# Patient Record
Sex: MALE | Race: OTHER
[De-identification: names, ages, dates, MRNs, and addresses within clinical notes are randomized per-mention and may not be internally consistent; named-entity substitution may affect disease eponyms.]

---

## 2018-06-09 ENCOUNTER — HOSPITAL ENCOUNTER (EMERGENCY)
Dept: HOSPITAL 76 - ED | Age: 83
Discharge: HOME | End: 2018-06-09
Payer: MEDICARE

## 2018-06-09 VITALS — DIASTOLIC BLOOD PRESSURE: 69 MMHG | SYSTOLIC BLOOD PRESSURE: 123 MMHG

## 2018-06-09 DIAGNOSIS — R10.33: Primary | ICD-10-CM

## 2018-06-09 DIAGNOSIS — K59.00: ICD-10-CM

## 2018-06-09 LAB
ALBUMIN DIAFP-MCNC: 3.9 G/DL (ref 3.2–5.5)
ALBUMIN/GLOB SERPL: 1.3 {RATIO} (ref 1–2.2)
ALP SERPL-CCNC: 56 IU/L (ref 42–121)
ALT SERPL W P-5'-P-CCNC: 17 IU/L (ref 10–60)
ANION GAP SERPL CALCULATED.4IONS-SCNC: 7 MMOL/L (ref 6–13)
AST SERPL W P-5'-P-CCNC: 18 IU/L (ref 10–42)
BASE EXCESS BLDV CALC-SCNC: 3.9 MMOL/L
BASOPHILS NFR BLD AUTO: 0.4 10^3/UL (ref 0–0.1)
BASOPHILS NFR BLD AUTO: 5 %
BILIRUB BLD-MCNC: 0.9 MG/DL (ref 0.2–1)
BUN SERPL-MCNC: 22 MG/DL (ref 6–20)
CALCIUM UR-MCNC: 9.5 MG/DL (ref 8.5–10.3)
CHLORIDE SERPL-SCNC: 99 MMOL/L (ref 101–111)
CO2 BLDV-SCNC: 31.5 MMOL/L (ref 24–29)
CO2 SERPL-SCNC: 31 MMOL/L (ref 21–32)
CREAT SERPLBLD-SCNC: 1.2 MG/DL (ref 0.6–1.2)
EOSINOPHIL # BLD AUTO: 0.2 10^3/UL (ref 0–0.7)
EOSINOPHIL NFR BLD AUTO: 2.6 %
ERYTHROCYTE [DISTWIDTH] IN BLOOD BY AUTOMATED COUNT: 13.4 % (ref 12–15)
GFRSERPLBLD MDRD-ARVRAT: 57 ML/MIN/{1.73_M2} (ref 89–?)
GLOBULIN SER-MCNC: 3 G/DL (ref 2.1–4.2)
GLUCOSE SERPL-MCNC: 104 MG/DL (ref 70–100)
HGB UR QL STRIP: 15.1 G/DL (ref 14–18)
LIPASE SERPL-CCNC: 30 U/L (ref 22–51)
LYMPHOCYTES # SPEC AUTO: 1.4 10^3/UL (ref 1.5–3.5)
LYMPHOCYTES NFR BLD AUTO: 18.9 %
MAGNESIUM SERPL-MCNC: 2.2 MG/DL (ref 1.7–2.8)
MCH RBC QN AUTO: 31.5 PG (ref 27–31)
MCHC RBC AUTO-ENTMCNC: 33.1 G/DL (ref 32–36)
MCV RBC AUTO: 95.1 FL (ref 80–94)
MONOCYTES # BLD AUTO: 0.6 10^3/UL (ref 0–1)
MONOCYTES NFR BLD AUTO: 8.2 %
NEUTROPHILS # BLD AUTO: 4.8 10^3/UL (ref 1.5–6.6)
NEUTROPHILS # SNV AUTO: 7.3 X10^3/UL (ref 4.8–10.8)
NEUTROPHILS NFR BLD AUTO: 65.3 %
PCO2 BLDV: 49.9 MMHG (ref 41–51)
PDW BLD AUTO: 7.9 FL (ref 7.4–11.4)
PH BLDV: 7.4 [PH] (ref 7.31–7.41)
PLATELET # BLD: 209 10^3/UL (ref 130–450)
PO2 BLDV: 23.7 MMHG (ref 25–47)
PROT SPEC-MCNC: 6.9 G/DL (ref 6.7–8.2)
RBC MAR: 4.79 10^6/UL (ref 4.7–6.1)
SODIUM SERPLBLD-SCNC: 137 MMOL/L (ref 135–145)

## 2018-06-09 PROCEDURE — 83735 ASSAY OF MAGNESIUM: CPT

## 2018-06-09 PROCEDURE — 99284 EMERGENCY DEPT VISIT MOD MDM: CPT

## 2018-06-09 PROCEDURE — 74177 CT ABD & PELVIS W/CONTRAST: CPT

## 2018-06-09 PROCEDURE — 85025 COMPLETE CBC W/AUTO DIFF WBC: CPT

## 2018-06-09 PROCEDURE — 36415 COLL VENOUS BLD VENIPUNCTURE: CPT

## 2018-06-09 PROCEDURE — 99283 EMERGENCY DEPT VISIT LOW MDM: CPT

## 2018-06-09 PROCEDURE — 83605 ASSAY OF LACTIC ACID: CPT

## 2018-06-09 PROCEDURE — 80053 COMPREHEN METABOLIC PANEL: CPT

## 2018-06-09 PROCEDURE — 83690 ASSAY OF LIPASE: CPT

## 2018-06-09 PROCEDURE — 82803 BLOOD GASES ANY COMBINATION: CPT

## 2018-06-09 NOTE — CT REPORT
EXAM:

CT ABDOMEN AND PELVIS

 

EXAM DATE: 6/9/2018 04:14 AM.

 

CLINICAL HISTORY: Diffuse abdominal pain.

 

COMPARISONS: None.

 

TECHNIQUE: Routine helical CT imaging was performed through the abdomen and pelvis. IV contrast: ISOV
  100mL. Enteric contrast: No. Reconstructions: Coronal and sagittal.

 

In accordance with CT protocol optimization, one or more of the following dose reduction techniques w
ere utilized for this exam: automated exposure control, adjustment of mA and/or KV based on patient s
ize, or use of iterative reconstructive technique.

 

FINDINGS: 

Lung Bases: Minimal bibasilar atelectasis. Small hiatal hernia.

 

Liver: Possible fatty infiltration.

 

Gallbladder/Bile Ducts: Status post cholecystectomy.

 

Spleen: Calcified granulomas.

 

Pancreas: Normal.

 

Adrenal Glands: Normal.

 

Kidneys: Normal. No masses or hydronephrosis.

 

Peritoneal Cavity/Bowel: Colonic diverticulosis. No evidence of diverticulitis. Moderate stool in the
 colon. No bowel obstruction seen. No lymphadenopathy. No free air or free fluid. Appendix is not wel
l seen. No evidence of appendicitis.

 

Pelvic Organs: Normal. The bladder and visualized pelvic organs are within normal limits.

 

Vasculature: Mild atherosclerosis. No aortic aneurysm.

 

Bones: Degenerative changes and postoperative changes in the spine. Grade 1 spondylolisthesis at L4-L
5.

 

Other: None.

 

IMPRESSION: 

1. Possible fatty liver. 

2. Colonic diverticulosis with no definite evidence of diverticulitis. There is moderate stool in the
 colon. 

3. Small hiatal hernia. 

4. No other acute abnormality seen.

 

[facility]

Referring Provider Line: [phone]

 

SITE ID: [id number]

## 2018-06-09 NOTE — CT PRELIMINARY REPORT
Accession: [id number]

Exam: CT ABDOMEN/PELVIS W/

 

IMPRESSION: 

1. Possible fatty liver. 

2. Colonic diverticulosis with no definite evidence of diverticulitis. There is moderate stool in the
 colon. 

3. Small hiatal hernia. 

4. No other acute abnormality seen.

 

[facility]

 

SITE ID: [id number]

## 2018-11-29 ENCOUNTER — HOSPITAL ENCOUNTER (OUTPATIENT)
Dept: HOSPITAL 76 - LAB.R | Age: 83
Discharge: HOME | End: 2018-11-29
Attending: INTERNAL MEDICINE
Payer: MEDICARE

## 2018-11-29 DIAGNOSIS — N40.0: ICD-10-CM

## 2018-11-29 DIAGNOSIS — K21.9: ICD-10-CM

## 2018-11-29 DIAGNOSIS — I44.0: Primary | ICD-10-CM

## 2018-11-29 LAB
ALBUMIN DIAFP-MCNC: 4.2 G/DL (ref 3.2–5.5)
ALBUMIN/GLOB SERPL: 1.4 {RATIO} (ref 1–2.2)
ALP SERPL-CCNC: 50 IU/L (ref 42–121)
ALT SERPL W P-5'-P-CCNC: 14 IU/L (ref 10–60)
ANION GAP SERPL CALCULATED.4IONS-SCNC: 4 MMOL/L (ref 6–13)
AST SERPL W P-5'-P-CCNC: 20 IU/L (ref 10–42)
BASOPHILS NFR BLD AUTO: 0 10^3/UL (ref 0–0.1)
BASOPHILS NFR BLD AUTO: 0.8 %
BILIRUB BLD-MCNC: 1.4 MG/DL (ref 0.2–1)
BUN SERPL-MCNC: 13 MG/DL (ref 6–20)
CALCIUM UR-MCNC: 9.1 MG/DL (ref 8.5–10.3)
CHLORIDE SERPL-SCNC: 101 MMOL/L (ref 101–111)
CO2 SERPL-SCNC: 32 MMOL/L (ref 21–32)
CREAT SERPLBLD-SCNC: 0.9 MG/DL (ref 0.6–1.2)
EOSINOPHIL # BLD AUTO: 0.1 10^3/UL (ref 0–0.7)
EOSINOPHIL NFR BLD AUTO: 1.9 %
ERYTHROCYTE [DISTWIDTH] IN BLOOD BY AUTOMATED COUNT: 13.7 % (ref 12–15)
GFRSERPLBLD MDRD-ARVRAT: 80 ML/MIN/{1.73_M2} (ref 89–?)
GLOBULIN SER-MCNC: 3 G/DL (ref 2.1–4.2)
GLUCOSE SERPL-MCNC: 84 MG/DL (ref 70–100)
HGB UR QL STRIP: 15.1 G/DL (ref 14–18)
LYMPHOCYTES # SPEC AUTO: 1.7 10^3/UL (ref 1.5–3.5)
LYMPHOCYTES NFR BLD AUTO: 32.3 %
MCH RBC QN AUTO: 31.2 PG (ref 27–31)
MCHC RBC AUTO-ENTMCNC: 33.7 G/DL (ref 32–36)
MCV RBC AUTO: 92.5 FL (ref 80–94)
MONOCYTES # BLD AUTO: 0.5 10^3/UL (ref 0–1)
MONOCYTES NFR BLD AUTO: 9 %
NEUTROPHILS # BLD AUTO: 2.9 10^3/UL (ref 1.5–6.6)
NEUTROPHILS # SNV AUTO: 5.2 X10^3/UL (ref 4.8–10.8)
NEUTROPHILS NFR BLD AUTO: 56 %
PDW BLD AUTO: 8.8 FL (ref 7.4–11.4)
PLATELET # BLD: 239 10^3/UL (ref 130–450)
PROT SPEC-MCNC: 7.2 G/DL (ref 6.7–8.2)
RBC MAR: 4.82 10^6/UL (ref 4.7–6.1)
SODIUM SERPLBLD-SCNC: 137 MMOL/L (ref 135–145)

## 2018-11-29 PROCEDURE — 80053 COMPREHEN METABOLIC PANEL: CPT

## 2018-11-29 PROCEDURE — 85025 COMPLETE CBC W/AUTO DIFF WBC: CPT

## 2019-01-03 ENCOUNTER — HOSPITAL ENCOUNTER (OUTPATIENT)
Dept: HOSPITAL 76 - DI | Age: 84
Discharge: HOME | End: 2019-01-03
Attending: INTERNAL MEDICINE
Payer: MEDICARE

## 2019-01-03 DIAGNOSIS — M51.36: ICD-10-CM

## 2019-01-03 DIAGNOSIS — M47.9: ICD-10-CM

## 2019-01-03 DIAGNOSIS — M16.0: Primary | ICD-10-CM

## 2019-01-03 DIAGNOSIS — M43.16: ICD-10-CM

## 2019-01-03 PROCEDURE — 72110 X-RAY EXAM L-2 SPINE 4/>VWS: CPT

## 2019-01-03 NOTE — XRAY REPORT
Reason:  LEG PAIN,RIGHT

Procedure Date:  01/03/2019   

Accession Number:  [id number] / [id number]                    

Procedure:  XR  - Lumbar Spine Complete CPT Code:  

 

FULL RESULT:

 

 

EXAM:

LUMBOSACRAL SPINE RADIOGRAPHY

 

EXAM DATE: 1/3/2019 05:16 PM.

 

CLINICAL HISTORY: Low back and right leg pain.

 

COMPARISONS: None.

 

TECHNIQUE: 5 views including obliques.

 

FINDINGS:

Alignment: No scoliosis. 2 mm retrolisthesis at L3-L4 and 8 mm 

anterolisthesis at L4-L5.

 

Bones: Five non-rib-bearing lumbar vertebral bodies are present. No 

fractures or bone lesions.

 

Disks: Disk space narrowing throughout the lumbar spine endplate spurring.

 

Facets: Lower lumbar facet arthropathy.

 

Sacroiliac Joints: Unremarkable.

 

Soft Tissues: Normal. The visualized bowel gas pattern is normal.

IMPRESSION:

1. Multilevel degenerative lumbar disk disease, and lower lumbar facet 

arthropathy.

2. Anterolisthesis at L4-L5 and retrolisthesis at L3-L4.

 

[facility]

## 2019-01-04 NOTE — XRAY REPORT
Reason:  LEG PAIN,RIGHT

Procedure Date:  01/03/2019   

Accession Number:  [id number] / [id number]                    

Procedure:  XR  - Hip w/Pelvis 2-3V RT CPT Code:  

 

FULL RESULT:

 

 

EXAM:

RIGHT HIP AND PELVIS RADIOGRAPHY

 

EXAM DATE: 1/3/2019 04:53 PM.

 

HISTORY: LEG PAIN,RIGHT.

 

COMPARISONS: Abdomen and pelvis CT 06/09/2018.

 

TECHNIQUE: 1 view of the pelvis and 1 view of the hip.

 

FINDINGS:

Bones: Right acetabulum is noted. No fracture or bone lesion. There is 

prominence of the femoral head-neck junctions.

 

Joints: There is mild bilateral subchondral sclerosis of the hips. Joint 

spaces appear preserved.

 

Soft Tissues:  No soft tissue swelling.

IMPRESSION: Mild bilateral osteoarthritis of the hips. There is 

prominence of the femoral head-neck junctions. Correlate clinically for 

femoral acetabular impingement

 

[facility]

## 2019-03-26 ENCOUNTER — HOSPITAL ENCOUNTER (OUTPATIENT)
Dept: HOSPITAL 76 - DI | Age: 84
Discharge: HOME | End: 2019-03-26
Attending: FAMILY MEDICINE
Payer: MEDICARE

## 2019-03-26 DIAGNOSIS — M12.511: Primary | ICD-10-CM

## 2019-03-27 NOTE — XRAY REPORT
Reason:  TRAUMATIC ARTHROPATHY OF RIGHT SHOULDER

Procedure Date:  03/26/2019   

Accession Number:  [id number] / [id number]                    

Procedure:  XR  - Shoulder 3 View RT CPT Code:  

 

FULL RESULT:

 

 

EXAM:

RIGHT SHOULDER RADIOGRAPHY

 

EXAM DATE: 3/26/2019 05:32 PM.

 

CLINICAL HISTORY: Traumatic arthropathy of right shoulder.

 

COMPARISON: None.

 

TECHNIQUE: 3 views.

 

FINDINGS:

Bones: Normal. No fracture or bone lesion.

 

Joints: The glenohumeral and acromioclavicular joints are normally 

located with mild degenerative changes.

 

Soft tissues: The visualized hemithorax is unremarkable. No soft tissue 

swelling.

IMPRESSION: No fracture or dislocation is identified.

 

[facility]

## 2019-06-11 ENCOUNTER — HOSPITAL ENCOUNTER (EMERGENCY)
Dept: HOSPITAL 76 - ED | Age: 84
Discharge: HOME | End: 2019-06-11
Payer: MEDICARE

## 2019-06-11 VITALS — SYSTOLIC BLOOD PRESSURE: 124 MMHG | DIASTOLIC BLOOD PRESSURE: 100 MMHG

## 2019-06-11 DIAGNOSIS — R73.9: ICD-10-CM

## 2019-06-11 DIAGNOSIS — J18.1: Primary | ICD-10-CM

## 2019-06-11 LAB
ANION GAP SERPL CALCULATED.4IONS-SCNC: 11 MMOL/L (ref 6–13)
BASOPHILS NFR BLD AUTO: 0 10^3/UL (ref 0–0.1)
BASOPHILS NFR BLD AUTO: 0.6 %
BUN SERPL-MCNC: 15 MG/DL (ref 6–20)
CALCIUM UR-MCNC: 8.6 MG/DL (ref 8.5–10.3)
CHLORIDE SERPL-SCNC: 99 MMOL/L (ref 101–111)
CO2 SERPL-SCNC: 25 MMOL/L (ref 21–32)
CREAT SERPLBLD-SCNC: 1 MG/DL (ref 0.6–1.2)
EOSINOPHIL # BLD AUTO: 0 10^3/UL (ref 0–0.7)
EOSINOPHIL NFR BLD AUTO: 0.7 %
ERYTHROCYTE [DISTWIDTH] IN BLOOD BY AUTOMATED COUNT: 13.3 % (ref 12–15)
GFRSERPLBLD MDRD-ARVRAT: 71 ML/MIN/{1.73_M2} (ref 89–?)
GLUCOSE SERPL-MCNC: 115 MG/DL (ref 70–100)
HGB UR QL STRIP: 14.2 G/DL (ref 14–18)
LYMPHOCYTES # SPEC AUTO: 1 10^3/UL (ref 1.5–3.5)
LYMPHOCYTES NFR BLD AUTO: 17.4 %
MCH RBC QN AUTO: 30.8 PG (ref 27–31)
MCHC RBC AUTO-ENTMCNC: 33.4 G/DL (ref 32–36)
MCV RBC AUTO: 92.1 FL (ref 80–94)
MONOCYTES # BLD AUTO: 0.8 10^3/UL (ref 0–1)
MONOCYTES NFR BLD AUTO: 12.9 %
NEUTROPHILS # BLD AUTO: 4 10^3/UL (ref 1.5–6.6)
NEUTROPHILS # SNV AUTO: 5.9 X10^3/UL (ref 4.8–10.8)
NEUTROPHILS NFR BLD AUTO: 68.4 %
PDW BLD AUTO: 7.4 FL (ref 7.4–11.4)
PLATELET # BLD: 197 10^3/UL (ref 130–450)
RBC MAR: 4.63 10^6/UL (ref 4.7–6.1)
SODIUM SERPLBLD-SCNC: 135 MMOL/L (ref 135–145)

## 2019-06-11 PROCEDURE — 99283 EMERGENCY DEPT VISIT LOW MDM: CPT

## 2019-06-11 PROCEDURE — 99284 EMERGENCY DEPT VISIT MOD MDM: CPT

## 2019-06-11 PROCEDURE — 80048 BASIC METABOLIC PNL TOTAL CA: CPT

## 2019-06-11 PROCEDURE — 36415 COLL VENOUS BLD VENIPUNCTURE: CPT

## 2019-06-11 PROCEDURE — 71046 X-RAY EXAM CHEST 2 VIEWS: CPT

## 2019-06-11 PROCEDURE — 85025 COMPLETE CBC W/AUTO DIFF WBC: CPT

## 2019-06-11 NOTE — ED PHYSICIAN DOCUMENTATION
History of Present Illness





- Stated complaint


Stated Complaint: FEVER





- Chief complaint


Chief Complaint: General





- History obtained from


History obtained from: Patient, Family





- History of Present Illness


Timing: How many days ago (2)


Severity Comments: moderately fatigued


Radiates to: no radiation, no pain


Improved by: nothing


Worsened by: nothing


Associated symptoms: cough and low grade fever of 100.7





- Treatment prior to arrival


Treatment prior to arrival: 





none





- Additonal information


Additional information: 





Pt brought to the ED by wife for being fatigued and having a low grade fever for

a couple of days.


Patient just tired, coughing. 


No sob. 


No abdominal pain. No vomiting. No urinary symptoms. No altered mental status or

headache. 





Review of Systems


Constitutional: reports: Fever, Chills, Fatigue


Throat: denies: Sore throat


Cardiac: denies: Chest pain / pressure


Respiratory: reports: Cough.  denies: Dyspnea, Hemoptysis, Wheezing


GI: denies: Abdominal Pain, Nausea, Vomiting


Musculoskeletal: denies: Neck pain, Back pain, Joint pain, Extremity swelling


Neurologic: reports: Generalized weakness.  denies: Headache





PD PAST MEDICAL HISTORY





- Past Medical History


Past Medical History: Yes


Respiratory: None


Neuro: None


Endocrine/Autoimmune: None


GI: GERD


: Benign prostate hypertrophy


HEENT: Glaucoma


Derm: None





- Past Surgical History


Past Surgical History: Yes





- Present Medications


Home Medications: 


                                Ambulatory Orders











 Medication  Instructions  Recorded  Confirmed


 


HYDROcod/ACETAM 5/325 [Vicodin 0.5 - 1 ea PO Q6H PRN #15 tablet 11/16/14 





5/325]   


 


diazePAM [Valium] 5 mg PO TID PRN #14 tablet 10/26/16 


 


Amox/Clav 875/125 [Augmentin] 1 each PO Q12H #14 tablet 06/11/19 














- Allergies


Allergies/Adverse Reactions: 


                                    Allergies











Allergy/AdvReac Type Severity Reaction Status Date / Time


 


diphenhydramine AdvReac Unknown Unknown Verified 06/11/19 15:58


 


naproxen AdvReac  Unknown Verified 06/11/19 15:58














- Social History


Does the pt smoke?: No


Smoking Status: Never smoker


Does the pt drink ETOH?: No


Does the pt have substance abuse?: No





- Immunizations


Immunizations are current?: Yes





- POLST


Patient has POLST: No





PD ED PE NORMAL





- Vitals


Vital signs reviewed: Yes





- General


General: Alert and oriented X 3, No acute distress





- HEENT


HEENT: Atraumatic, PERRL





- Neck


Neck: Supple, no meningeal sign, No JVD





- Cardiac


Cardiac: RRR, No murmur, No gallop, No rub





- Respiratory


Respiratory: No respiratory distress





- Abdomen


Abdomen: Soft, Non tender, Non distended





- Male 


Male : Deferred





- Rectal


Rectal: Deferred





- Derm


Derm: Normal color, Warm and dry, No rash





- Neuro


Neuro: Alert and oriented X 3


Eye Opening: Spontaneous


Motor: Obeys Commands


Verbal: Oriented


GCS Score: 15





- Psych


Psych: Normal mood, Normal affect





PD ED PE EXPANDED





- Respiratory


Respiratory: Rales, Left lower lobe





Results





- Vitals


Vitals: 


                               Vital Signs - 24 hr











  06/11/19 06/11/19





  15:55 16:04


 


Temperature 38.3 C H 38.2 C H


 


Heart Rate 68 


 


Respiratory 18 





Rate  


 


Blood Pressure 140/61 H 


 


O2 Saturation 97 








                                     Oxygen











O2 Source                      Room air

















- Labs


Labs: 


                                Laboratory Tests











  06/11/19 06/11/19





  16:50 16:50


 


WBC  5.9 


 


RBC  4.63 L 


 


Hgb  14.2 


 


Hct  42.6 


 


MCV  92.1 


 


MCH  30.8 


 


MCHC  33.4 


 


RDW  13.3 


 


Plt Count  197 


 


MPV  7.4 


 


Neut # (Auto)  4.0 


 


Lymph # (Auto)  1.0 L 


 


Mono # (Auto)  0.8 


 


Eos # (Auto)  0.0 


 


Baso # (Auto)  0.0 


 


Absolute Nucleated RBC  0.01 


 


Nucleated RBC %  0.1 


 


Sodium   135


 


Potassium   4.0


 


Chloride   99 L


 


Carbon Dioxide   25


 


Anion Gap   11.0


 


BUN   15


 


Creatinine   1.0


 


Estimated GFR (MDRD)   71 L


 


Glucose   115 H


 


Calcium   8.6








mild hyperglycemia





- Rads (name of study)


  ** No standard instances


Radiology: Final report received (no acute disease on chest xray )





PD MEDICAL DECISION MAKING





- ED course


Complexity details: reviewed results, re-evaluated patient, considered d

ifferential, d/w patient, d/w family


ED course: 


ddx - pneumonia, bronchitis, viral URI, viral syndrome, sinusitis, meningitis.


87 y/o M well appearing but with low grade fever, cough. 


Mild LL rales. Normal sats. 


Normal exam otherwise.


No meningeal signs. Pt not altered.


Labs are normal except very mild hyperglycemia.


CXR clear but pt clinical picture is most c/w pneumonia. 


Will initiate outpatient antibiotics and pt is stable for outpt f/u with strict 

return precautions if worsening symptoms. 





Departure





- Departure


Disposition: 01 Home, Self Care


Clinical Impression: 


Pneumonia


Qualifiers:


 Pneumonia type: due to unspecified organism Laterality: left Lung location: 

lower lobe of lung Qualified Code(s): J18.1 - Lobar pneumonia, unspecified 

organism





Condition: Stable


Record reviewed to determine appropriate education?: Yes


Instructions:  Pneumonia Dc


Follow-Up: 


Ernesto Welsh MD [Primary Care Provider] - Within 3 Days (recheck your 

symptoms)


Prescriptions: 


Amox/Clav 875/125 [Augmentin] 1 each PO Q12H #14 tablet


Comments: 


Your labs in the ED today were normal.


Your chest xray did not show any abnormality however you clinically appear to 

have a pneumonia on your examination.


Start the antibiotics prescribed and follow up with your doctor to recheck your 

symptoms.


Take tylenol as needed for fever or pain.


If worsening symptoms please return to the ED.

## 2019-06-11 NOTE — XRAY REPORT
Reason:  cough

Procedure Date:  06/11/2019   

Accession Number:  [id number] / [id number]                    

Procedure:  XR  - Chest 2 View X-Ray CPT Code:  56454

 

FULL RESULT:

 

 

EXAM:

CHEST RADIOGRAPHY

 

EXAM DATE: 6/11/2019 04:37 PM.

 

CLINICAL HISTORY: Cough.

 

COMPARISON: ABDOMEN/PELVIS W/ 06/09/2018 4:02 AM.

 

TECHNIQUE: 2 views.

 

FINDINGS:

Lungs/Pleura: No evidence of lobar consolidation or effusion. There are 

punctate opacities within the lungs which could represent small 

granulomas. Lungs are well expanded. No pneumothorax.

 

Mediastinum: Heart and mediastinal contours are unremarkable.

 

Other: None.

IMPRESSION: No acute intrathoracic plain film abnormality.

 

[facility]

## 2019-09-14 ENCOUNTER — HOSPITAL ENCOUNTER (EMERGENCY)
Dept: HOSPITAL 76 - ED | Age: 84
Discharge: HOME | End: 2019-09-14
Payer: MEDICARE

## 2019-09-14 VITALS — SYSTOLIC BLOOD PRESSURE: 102 MMHG | DIASTOLIC BLOOD PRESSURE: 51 MMHG

## 2019-09-14 DIAGNOSIS — A08.4: Primary | ICD-10-CM

## 2019-09-14 LAB
ALBUMIN DIAFP-MCNC: 4.2 G/DL (ref 3.2–5.5)
ALBUMIN/GLOB SERPL: 1.4 {RATIO} (ref 1–2.2)
ALP SERPL-CCNC: 57 IU/L (ref 42–121)
ALT SERPL W P-5'-P-CCNC: 19 IU/L (ref 10–60)
ANION GAP SERPL CALCULATED.4IONS-SCNC: 11 MMOL/L (ref 6–13)
AST SERPL W P-5'-P-CCNC: 27 IU/L (ref 10–42)
BASOPHILS NFR BLD AUTO: 0.1 10^3/UL (ref 0–0.1)
BASOPHILS NFR BLD AUTO: 0.8 %
BILIRUB BLD-MCNC: 1.5 MG/DL (ref 0.2–1)
BILIRUB UR QL CFM: NEGATIVE
BUN SERPL-MCNC: 19 MG/DL (ref 6–20)
CALCIUM UR-MCNC: 8.7 MG/DL (ref 8.5–10.3)
CHLORIDE SERPL-SCNC: 96 MMOL/L (ref 101–111)
CLARITY UR REFRACT.AUTO: CLEAR
CO2 SERPL-SCNC: 30 MMOL/L (ref 21–32)
CREAT SERPLBLD-SCNC: 1.3 MG/DL (ref 0.6–1.2)
EOSINOPHIL # BLD AUTO: 0 10^3/UL (ref 0–0.7)
EOSINOPHIL NFR BLD AUTO: 0.2 %
ERYTHROCYTE [DISTWIDTH] IN BLOOD BY AUTOMATED COUNT: 13.1 % (ref 12–15)
GFRSERPLBLD MDRD-ARVRAT: 52 ML/MIN/{1.73_M2} (ref 89–?)
GLOBULIN SER-MCNC: 3 G/DL (ref 2.1–4.2)
GLUCOSE SERPL-MCNC: 108 MG/DL (ref 70–100)
GLUCOSE UR QL STRIP.AUTO: NEGATIVE MG/DL
HGB UR QL STRIP: 16.5 G/DL (ref 14–18)
KETONES UR QL STRIP.AUTO: (no result) MG/DL
LIPASE SERPL-CCNC: 26 U/L (ref 22–51)
LYMPHOCYTES # SPEC AUTO: 0.9 10^3/UL (ref 1.5–3.5)
LYMPHOCYTES NFR BLD AUTO: 9.7 %
MCH RBC QN AUTO: 31.3 PG (ref 27–31)
MCHC RBC AUTO-ENTMCNC: 33.6 G/DL (ref 32–36)
MCV RBC AUTO: 93 FL (ref 80–94)
MONOCYTES # BLD AUTO: 1.1 10^3/UL (ref 0–1)
MONOCYTES NFR BLD AUTO: 12.1 %
MUCOUS THREADS URNS QL MICRO: (no result)
NEUTROPHILS # BLD AUTO: 6.8 10^3/UL (ref 1.5–6.6)
NEUTROPHILS # SNV AUTO: 8.9 X10^3/UL (ref 4.8–10.8)
NEUTROPHILS NFR BLD AUTO: 76.9 %
NITRITE UR QL STRIP.AUTO: NEGATIVE
PDW BLD AUTO: 9.3 FL (ref 7.4–11.4)
PH UR STRIP.AUTO: 6 PH (ref 5–7.5)
PLATELET # BLD: 214 10^3/UL (ref 130–450)
PROT SPEC-MCNC: 7.2 G/DL (ref 6.7–8.2)
PROT UR STRIP.AUTO-MCNC: NEGATIVE MG/DL
RBC # UR STRIP.AUTO: (no result) /UL
RBC # URNS HPF: (no result) /HPF (ref 0–5)
RBC MAR: 5.28 10^6/UL (ref 4.7–6.1)
SODIUM SERPLBLD-SCNC: 137 MMOL/L (ref 135–145)
SP GR UR STRIP.AUTO: 1.02 (ref 1–1.03)
SQUAMOUS URNS QL MICRO: (no result)
UROBILINOGEN UR QL STRIP.AUTO: (no result) E.U./DL
UROBILINOGEN UR STRIP.AUTO-MCNC: NEGATIVE MG/DL

## 2019-09-14 PROCEDURE — 36415 COLL VENOUS BLD VENIPUNCTURE: CPT

## 2019-09-14 PROCEDURE — 81003 URINALYSIS AUTO W/O SCOPE: CPT

## 2019-09-14 PROCEDURE — 85025 COMPLETE CBC W/AUTO DIFF WBC: CPT

## 2019-09-14 PROCEDURE — 96361 HYDRATE IV INFUSION ADD-ON: CPT

## 2019-09-14 PROCEDURE — 99283 EMERGENCY DEPT VISIT LOW MDM: CPT

## 2019-09-14 PROCEDURE — 87086 URINE CULTURE/COLONY COUNT: CPT

## 2019-09-14 PROCEDURE — 83690 ASSAY OF LIPASE: CPT

## 2019-09-14 PROCEDURE — 96374 THER/PROPH/DIAG INJ IV PUSH: CPT

## 2019-09-14 PROCEDURE — 81001 URINALYSIS AUTO W/SCOPE: CPT

## 2019-09-14 PROCEDURE — 99284 EMERGENCY DEPT VISIT MOD MDM: CPT

## 2019-09-14 PROCEDURE — 80053 COMPREHEN METABOLIC PANEL: CPT

## 2019-09-14 NOTE — ED PHYSICIAN DOCUMENTATION
History of Present Illness





- Stated complaint


Stated Complaint: VOMITING/NOT EATING





- Chief complaint


Chief Complaint: Abd Pain





- History obtained from


History obtained from: Patient





- History of Present Illness


Timing: How many days ago (3)


Pain level max: 3


Pain level now: 2





- Additonal information


Additional information: 





89-year-old male presents to the emergency department with weakness for the past

3 days.  States that he had diarrhea for the past 2 days.  Today and last night 

started vomiting.  No fevers.  No recent travel.  Worse with eating, nothing 

makes it better.  No recent antibiotics.  No sick contacts.  States that his 

joints are sore as well.





Review of Systems


Ten Systems: 10 systems reviewed and negative


Constitutional: reports: Fever (States had a T-max of 100.7 at home).  denies: 

Chills


Nose: denies: Rhinorrhea / runny nose, Congestion


Throat: denies: Sore throat


Respiratory: denies: Cough


GI: reports: Nausea, Vomiting, Diarrhea.  denies: Abdominal Pain


: denies: Dysuria


Skin: denies: Rash


Musculoskeletal: denies: Neck pain, Back pain


Neurologic: denies: Headache





PD PAST MEDICAL HISTORY





- Past Medical History


Past Medical History: Yes


Respiratory: None


Neuro: None


Endocrine/Autoimmune: None


GI: GERD


: Benign prostate hypertrophy


HEENT: Glaucoma


Derm: None





- Past Surgical History


Past Surgical History: Yes





- Present Medications


Home Medications: 


                                Ambulatory Orders











 Medication  Instructions  Recorded  Confirmed


 


HYDROcod/ACETAM 5/325 [Vicodin 0.5 - 1 ea PO Q6H PRN #15 tablet 11/16/14 





5/325]   


 


diazePAM [Valium] 5 mg PO TID PRN #14 tablet 10/26/16 


 


Amox/Clav 875/125 [Augmentin] 1 each PO Q12H #14 tablet 06/11/19 


 


Ondansetron Odt [Zofran] 4 mg TL Q6H PRN #10 tablet 09/14/19 














- Allergies


Allergies/Adverse Reactions: 


                                    Allergies











Allergy/AdvReac Type Severity Reaction Status Date / Time


 


diphenhydramine AdvReac Unknown Unknown Verified 09/14/19 12:52


 


naproxen AdvReac  Unknown Verified 09/14/19 12:52














- Social History


Does the pt smoke?: No


Smoking Status: Never smoker


Does the pt drink ETOH?: No


Does the pt have substance abuse?: No





- Immunizations


Immunizations are current?: Yes





- POLST


Patient has POLST: No





PD ED PE NORMAL





- Vitals


Vital signs reviewed: Yes





- General


General: Alert and oriented X 3, No acute distress, Well developed/nourished





- HEENT


HEENT: PERRL, Moist mucous membranes





- Neck


Neck: Supple, no meningeal sign





- Cardiac


Cardiac: RRR, Strong equal pulses





- Respiratory


Respiratory: No respiratory distress, Clear bilaterally





- Abdomen


Abdomen: Normal bowel sounds, Soft, Non tender, Non distended





- Back


Back: No CVA TTP





- Derm


Derm: Warm and dry





- Extremities


Extremities: No edema





- Neuro


Neuro: Alert and oriented X 3





- Psych


Psych: Normal mood, Normal affect





Results





- Vitals


Vitals: 


                               Vital Signs - 24 hr











  09/14/19





  12:48


 


Temperature 35.8 C L


 


Heart Rate 78


 


Respiratory 19





Rate 


 


Blood Pressure 118/94 H


 


O2 Saturation 99








                                     Oxygen











O2 Source                      Room air

















- Labs


Labs: 


                                Laboratory Tests











  09/14/19 09/14/19 09/14/19





  13:05 13:05 13:35


 


WBC  8.9  


 


RBC  5.28  


 


Hgb  16.5  


 


Hct  49.1  


 


MCV  93.0  


 


MCH  31.3 H  


 


MCHC  33.6  


 


RDW  13.1  


 


Plt Count  214  


 


MPV  9.3  


 


Neut # (Auto)  6.8 H  


 


Lymph # (Auto)  0.9 L  


 


Mono # (Auto)  1.1 H  


 


Eos # (Auto)  0.0  


 


Baso # (Auto)  0.1  


 


Absolute Nucleated RBC  0.00  


 


Nucleated RBC %  0.0  


 


Sodium   137 


 


Potassium   3.9 


 


Chloride   96 L 


 


Carbon Dioxide   30 


 


Anion Gap   11.0 


 


BUN   19 


 


Creatinine   1.3 H 


 


Estimated GFR (MDRD)   52 L 


 


Glucose   108 H 


 


Calcium   8.7 


 


Total Bilirubin   1.5 H 


 


AST   27 


 


ALT   19 


 


Alkaline Phosphatase   57 


 


Total Protein   7.2 


 


Albumin   4.2 


 


Globulin   3.0 


 


Albumin/Globulin Ratio   1.4 


 


Lipase   26 


 


Urine Color    DARK YELLOW


 


Urine Clarity    CLEAR


 


Urine pH    6.0


 


Ur Specific Gravity    1.020


 


Urine Protein    NEGATIVE


 


Urine Glucose (UA)    NEGATIVE


 


Urine Ketones    TRACE


 


Urine Occult Blood    SMALL H


 


Urine Nitrite    NEGATIVE


 


Urine Bilirubin    NEGATIVE


 


Urine Urobilinogen    0.2 (NORMAL)


 


Ur Leukocyte Esterase    NEGATIVE


 


Urine RBC    0-5


 


Urine WBC    0-3


 


Ur Squamous Epith Cells    RARE Squamous


 


Urine Bacteria    Rare


 


Urine Mucus    Few Strands


 


Ur Microscopic Review    INDICATED


 


Urine Culture Comments    NOT INDICATED














PD MEDICAL DECISION MAKING





- ED course


Complexity details: reviewed results, re-evaluated patient, considered 

differential, d/w patient, d/w family


ED course: 





89-year-old male with what appears to be a viral gastroenteritis.  He is very 

well-appearing, nontoxic.  Afebrile.  Feels better after IV fluids and Zofran.  

Tolerating p.o. without difficulty.  We will follow-up with his doctor for 

further care.  Abdomen is soft, nontender nondistended on serial exam. Patient 

counseled regarding signs and symptoms for which I believe and urgent re-

evaluation would be necessary. Patient with good understanding of and agreement 

to plan and is comfortable going home at this time





This document was made in part using voice recognition software. While efforts 

are made to proofread this document, sound alike and grammatical errors may 

occur.





Departure





- Departure


Disposition: 01 Home, Self Care


Clinical Impression: 


 Viral gastroenteritis





Condition: Good


Instructions:  ED Gastroenteritis Viral


Follow-Up: 


Ernesto Welsh MD [Primary Care Provider] - Within 1 week


Prescriptions: 


Ondansetron Odt [Zofran] 4 mg TL Q6H PRN #10 tablet


 PRN Reason: Nausea / Vomiting


Comments: 


Drink plenty of water at home.  Return if you worsen.  This should improve over 

the next day or 2.  This is likely a viral illness.

## 2020-05-04 ENCOUNTER — HOSPITAL ENCOUNTER (EMERGENCY)
Dept: HOSPITAL 76 - ED | Age: 85
Discharge: HOME | End: 2020-05-04
Payer: MEDICARE

## 2020-05-04 VITALS — SYSTOLIC BLOOD PRESSURE: 129 MMHG | DIASTOLIC BLOOD PRESSURE: 80 MMHG

## 2020-05-04 DIAGNOSIS — R68.83: Primary | ICD-10-CM

## 2020-05-04 LAB
ALBUMIN DIAFP-MCNC: 4.3 G/DL (ref 3.2–5.5)
ALBUMIN/GLOB SERPL: 1.5 {RATIO} (ref 1–2.2)
ALP SERPL-CCNC: 47 IU/L (ref 42–121)
ALT SERPL W P-5'-P-CCNC: 16 IU/L (ref 10–60)
ANION GAP SERPL CALCULATED.4IONS-SCNC: 8 MMOL/L (ref 6–13)
AST SERPL W P-5'-P-CCNC: 26 IU/L (ref 10–42)
BASOPHILS NFR BLD AUTO: 0.1 10^3/UL (ref 0–0.1)
BASOPHILS NFR BLD AUTO: 0.9 %
BILIRUB BLD-MCNC: 1.3 MG/DL (ref 0.2–1)
BUN SERPL-MCNC: 15 MG/DL (ref 6–20)
CALCIUM UR-MCNC: 8.9 MG/DL (ref 8.5–10.3)
CHLORIDE SERPL-SCNC: 101 MMOL/L (ref 101–111)
CLARITY UR REFRACT.AUTO: CLEAR
CO2 SERPL-SCNC: 28 MMOL/L (ref 21–32)
CREAT SERPLBLD-SCNC: 0.9 MG/DL (ref 0.6–1.2)
EOSINOPHIL # BLD AUTO: 0.2 10^3/UL (ref 0–0.7)
EOSINOPHIL NFR BLD AUTO: 2.9 %
ERYTHROCYTE [DISTWIDTH] IN BLOOD BY AUTOMATED COUNT: 12.9 % (ref 12–15)
GLOBULIN SER-MCNC: 2.9 G/DL (ref 2.1–4.2)
GLUCOSE SERPL-MCNC: 104 MG/DL (ref 70–100)
GLUCOSE UR QL STRIP.AUTO: NEGATIVE MG/DL
HGB UR QL STRIP: 14.8 G/DL (ref 14–18)
KETONES UR QL STRIP.AUTO: NEGATIVE MG/DL
LIPASE SERPL-CCNC: 34 U/L (ref 22–51)
LYMPHOCYTES # SPEC AUTO: 2.2 10^3/UL (ref 1.5–3.5)
LYMPHOCYTES NFR BLD AUTO: 37.8 %
MCH RBC QN AUTO: 30.5 PG (ref 27–31)
MCHC RBC AUTO-ENTMCNC: 32.8 G/DL (ref 32–36)
MCV RBC AUTO: 92.8 FL (ref 80–94)
MONOCYTES # BLD AUTO: 0.6 10^3/UL (ref 0–1)
MONOCYTES NFR BLD AUTO: 9.6 %
NEUTROPHILS # BLD AUTO: 2.8 10^3/UL (ref 1.5–6.6)
NEUTROPHILS # SNV AUTO: 5.9 X10^3/UL (ref 4.8–10.8)
NEUTROPHILS NFR BLD AUTO: 48.3 %
NITRITE UR QL STRIP.AUTO: NEGATIVE
PDW BLD AUTO: 9.4 FL (ref 7.4–11.4)
PH UR STRIP.AUTO: 7 PH (ref 5–7.5)
PLATELET # BLD: 212 10^3/UL (ref 130–450)
PROT SPEC-MCNC: 7.2 G/DL (ref 6.7–8.2)
PROT UR STRIP.AUTO-MCNC: NEGATIVE MG/DL
RBC # UR STRIP.AUTO: NEGATIVE /UL
RBC MAR: 4.86 10^6/UL (ref 4.7–6.1)
SODIUM SERPLBLD-SCNC: 137 MMOL/L (ref 135–145)
SP GR UR STRIP.AUTO: 1.01 (ref 1–1.03)
UROBILINOGEN UR QL STRIP.AUTO: (no result) E.U./DL
UROBILINOGEN UR STRIP.AUTO-MCNC: NEGATIVE MG/DL

## 2020-05-04 PROCEDURE — 83690 ASSAY OF LIPASE: CPT

## 2020-05-04 PROCEDURE — 99283 EMERGENCY DEPT VISIT LOW MDM: CPT

## 2020-05-04 PROCEDURE — 36415 COLL VENOUS BLD VENIPUNCTURE: CPT

## 2020-05-04 PROCEDURE — 85025 COMPLETE CBC W/AUTO DIFF WBC: CPT

## 2020-05-04 PROCEDURE — 71045 X-RAY EXAM CHEST 1 VIEW: CPT

## 2020-05-04 PROCEDURE — 83605 ASSAY OF LACTIC ACID: CPT

## 2020-05-04 PROCEDURE — 81599 UNLISTED MAAA: CPT

## 2020-05-04 PROCEDURE — 81001 URINALYSIS AUTO W/SCOPE: CPT

## 2020-05-04 PROCEDURE — 81003 URINALYSIS AUTO W/O SCOPE: CPT

## 2020-05-04 PROCEDURE — 87040 BLOOD CULTURE FOR BACTERIA: CPT

## 2020-05-04 PROCEDURE — 80053 COMPREHEN METABOLIC PANEL: CPT

## 2020-05-04 PROCEDURE — 87086 URINE CULTURE/COLONY COUNT: CPT

## 2020-05-04 PROCEDURE — 99284 EMERGENCY DEPT VISIT MOD MDM: CPT

## 2020-05-04 NOTE — ED PHYSICIAN DOCUMENTATION
History of Present Illness





- Stated complaint


Stated Complaint: SHAKY/COLD





- Chief complaint


Chief Complaint: Abd Pain





- History obtained from


History obtained from: Patient





- History of Present Illness


Timing: Today





- Additonal information


Additional information: 





89-year-old male who comes to the emerge department with a chief complaint of 

shaking.  He feels that he is shaking or trembling inside and he has not felt 

well for about 2 days.  He states that he drank some Listerine and spit it out 

because it tasted horrible and following that he developed a bit of a 

stomachache and did not feel well for 2 days.  This evening he has come to the 

emergency department with shaking chills.  He denies any specific other symptoms

denies any fever or cough he denies any urinary urgency he does have frequency 

and gets up about 4 times per night.  This is unchanged.





Feels like he just cant get warm. 





Review of Systems


Constitutional: reports: Chills, Fatigue.  denies: Fever


Eyes: denies: Decreased vision


Ears: denies: Ear pain


Nose: denies: Rhinorrhea / runny nose, Congestion


Throat: denies: Sore throat


Cardiac: denies: Chest pain / pressure, Palpitations


Respiratory: denies: Dyspnea, Cough


GI: reports: Abdominal Pain, Nausea.  denies: Vomiting, Constipation, Diarrhea


: reports: Frequency.  denies: Dysuria, Hematuria


Skin: denies: Rash


Musculoskeletal: denies: Neck pain, Back pain, Extremity pain





PD PAST MEDICAL HISTORY





- Past Medical History


Past Medical History: Yes


Respiratory: None


Neuro: None


Endocrine/Autoimmune: None


GI: GERD


: Benign prostate hypertrophy


HEENT: Glaucoma


Derm: None





- Past Surgical History


Past Surgical History: Yes





- Present Medications


Home Medications: 


                                Ambulatory Orders











 Medication  Instructions  Recorded  Confirmed


 


HYDROcod/ACETAM 5/325 [Vicodin 0.5 - 1 ea PO Q6H PRN #15 tablet 11/16/14 





5/325]   


 


diazePAM [Valium] 5 mg PO TID PRN #14 tablet 10/26/16 


 


Amox/Clav 875/125 [Augmentin] 1 each PO Q12H #14 tablet 06/11/19 


 


Ondansetron Odt [Zofran] 4 mg TL Q6H PRN #10 tablet 09/14/19 














- Allergies


Allergies/Adverse Reactions: 


                                    Allergies











Allergy/AdvReac Type Severity Reaction Status Date / Time


 


diphenhydramine AdvReac Unknown Unknown Verified 05/04/20 04:15


 


naproxen AdvReac  Unknown Verified 05/04/20 04:15














- Social History


Does the pt smoke?: No


Smoking Status: Never smoker


Does the pt drink ETOH?: No


Does the pt have substance abuse?: No





- Immunizations


Immunizations are current?: Yes





- POLST


Patient has POLST: No





PD ED PE NORMAL





- Vitals


Vital signs reviewed: Yes (Hypertensive mild)





- General


General: Alert and oriented X 3, No acute distress, Well developed/nourished





- HEENT


HEENT: Atraumatic, PERRL, EOMI, Ears normal, Moist mucous membranes





- Neck


Neck: Supple, no meningeal sign, No bony TTP





- Cardiac


Cardiac: RRR, No murmur





- Respiratory


Respiratory: No respiratory distress, Clear bilaterally





- Abdomen


Abdomen: Soft, Non tender





- Back


Back: No CVA TTP, No spinal TTP





- Derm


Derm: Normal color, Warm and dry, No rash





- Extremities


Extremities: No deformity, No edema





- Neuro


Neuro: Alert and oriented X 3, CNs 2-12 intact, No motor deficit, No sensory 

deficit, Normal speech


Eye Opening: Spontaneous


Motor: Obeys Commands


Verbal: Oriented


GCS Score: 15





- Psych


Psych: Normal mood, Normal affect





Results





- Vitals


Vitals: 


                               Vital Signs - 24 hr











  05/04/20





  04:00


 


Temperature 36.8 C


 


Heart Rate 58 L


 


Respiratory 18





Rate 


 


Blood Pressure 161/81 H


 


O2 Saturation 100








                                     Oxygen











O2 Source                      Room air

















- Labs


Labs: 


                                Laboratory Tests











  05/04/20 05/04/20 05/04/20





  04:30 04:44 04:44


 


WBC   5.9 


 


RBC   4.86 


 


Hgb   14.8 


 


Hct   45.1 


 


MCV   92.8 


 


MCH   30.5 


 


MCHC   32.8 


 


RDW   12.9 


 


Plt Count   212 


 


MPV   9.4 


 


Neut # (Auto)   2.8 


 


Lymph # (Auto)   2.2 


 


Mono # (Auto)   0.6 


 


Eos # (Auto)   0.2 


 


Baso # (Auto)   0.1 


 


Absolute Nucleated RBC   0.00 


 


Nucleated RBC %   0.0 


 


Sodium    137


 


Potassium    3.7


 


Chloride    101


 


Carbon Dioxide    28


 


Anion Gap    8.0


 


BUN    15


 


Creatinine    0.9


 


Estimated GFR (MDRD)    79 L


 


Glucose    104 H


 


Lactic Acid   


 


Calcium    8.9


 


Total Bilirubin    1.3 H


 


AST    26


 


ALT    16


 


Alkaline Phosphatase    47


 


Total Protein    7.2


 


Albumin    4.3


 


Globulin    2.9


 


Albumin/Globulin Ratio    1.5


 


Lipase    34


 


Urine Color  YELLOW  


 


Urine Clarity  CLEAR  


 


Urine pH  7.0  


 


Ur Specific Gravity  1.015  


 


Urine Protein  NEGATIVE  


 


Urine Glucose (UA)  NEGATIVE  


 


Urine Ketones  NEGATIVE  


 


Urine Occult Blood  NEGATIVE  


 


Urine Nitrite  NEGATIVE  


 


Urine Bilirubin  NEGATIVE  


 


Urine Urobilinogen  0.2 (NORMAL)  


 


Ur Leukocyte Esterase  NEGATIVE  


 


Ur Microscopic Review  NOT INDICATED  


 


Urine Culture Comments  NOT INDICATED  














  05/04/20





  04:44


 


WBC 


 


RBC 


 


Hgb 


 


Hct 


 


MCV 


 


MCH 


 


MCHC 


 


RDW 


 


Plt Count 


 


MPV 


 


Neut # (Auto) 


 


Lymph # (Auto) 


 


Mono # (Auto) 


 


Eos # (Auto) 


 


Baso # (Auto) 


 


Absolute Nucleated RBC 


 


Nucleated RBC % 


 


Sodium 


 


Potassium 


 


Chloride 


 


Carbon Dioxide 


 


Anion Gap 


 


BUN 


 


Creatinine 


 


Estimated GFR (MDRD) 


 


Glucose 


 


Lactic Acid  1.1


 


Calcium 


 


Total Bilirubin 


 


AST 


 


ALT 


 


Alkaline Phosphatase 


 


Total Protein 


 


Albumin 


 


Globulin 


 


Albumin/Globulin Ratio 


 


Lipase 


 


Urine Color 


 


Urine Clarity 


 


Urine pH 


 


Ur Specific Gravity 


 


Urine Protein 


 


Urine Glucose (UA) 


 


Urine Ketones 


 


Urine Occult Blood 


 


Urine Nitrite 


 


Urine Bilirubin 


 


Urine Urobilinogen 


 


Ur Leukocyte Esterase 


 


Ur Microscopic Review 


 


Urine Culture Comments 














- Rads (name of study)


  ** chest


Radiology: Prelim report reviewed (Impression: Stable x-ray.  No evidence of 

acute cardiopulmonary process.), EMP read indepedently, See rad report





PD MEDICAL DECISION MAKING





- ED course


Complexity details: reviewed old records, reviewed results, re-evaluated 

patient, considered differential, d/w patient


ED course: 





89-year-old previously well male with shaking chills is no obvious findings on 

physical examination by history and by laboratory and ancillary evaluation.





Departure





- Departure


Disposition: 01 Home, Self Care


Clinical Impression: 


 Chills without fever





Condition: Stable


Instructions:  ED Viral Syndrome


Follow-Up: 


Ernesto Welsh MD [Primary Care Provider] -

## 2020-05-04 NOTE — XRAY REPORT
Reason:  chest pain

Procedure Date:  05/04/2020   

Accession Number:  [id number] / [id number]                    

Procedure:  XR  - Chest 1 View X-Ray CPT Code:  70109

 

***Final Report***

 

 

FULL RESULT:

 

 

EXAM:

CHEST RADIOGRAPHY

 

EXAM DATE: 5/4/2020 04:44 AM.

 

CLINICAL HISTORY: Chest pain.

 

COMPARISON: CHEST 2 VIEW 06/11/2019 4:32 PM.

 

TECHNIQUE: 2 frontal views obtained.

 

FINDINGS:

Lungs/Pleura: Stable small pulmonary granulomas. No lobar consolidation. 

No pulmonary edema. No pleural effusion. No pneumothorax.

 

Mediastinum: Stable cardiomediastinal contours with borderline cardiac 

silhouette size.

 

Other: None.

IMPRESSION: Stable CXR. No evidence of acute cardiopulmonary process.

 

[facility]

## 2021-04-01 NOTE — ED PHYSICIAN DOCUMENTATION
PD HPI ABD PAIN





- Stated complaint


Stated Complaint: ABD PAIN/ANXIETY





- Chief complaint


Chief Complaint: Abd Pain





- History obtained from


History obtained from: Patient, Family





- History of Present Illness


Timing - onset: How many days ago (2)


Timing - details: Gradual onset, Intermittant


Quality: Cramping, Aching


Location: Periumbilical, Suprapubic


Worsened by: Palpation


Associated symptoms: Nausea, Constipation.  No: Fever, Vomiting, Diarrhea


Similar symptoms before: Work up / diagnostics


Recently seen: Emergency Dept





- Additional information


Additional information: 





Patient is an 87 year old male with no significant past medical history who is 

presenting to the emergency department for abdominal pain.  Patient states that 

the pain has been intermittent for the last few days.  patient was seen in 

another emergency department a few days prior and diagnosed with allergies. 

Patient states that his pain today is around his umbilicus.  





Review of Systems


Constitutional: denies: Fever, Chills


Nose: denies: Rhinorrhea / runny nose, Congestion


Cardiac: denies: Chest pain / pressure, Palpitations


Respiratory: denies: Dyspnea, Cough, Wheezing


GI: reports: Abdominal Pain, Constipation.  denies: Nausea, Vomiting, Diarrhea, 

Hematemesis


: denies: Dysuria, Frequency, Unable to Void, Hematuria


Skin: denies: Rash, Lesions


Musculoskeletal: denies: Back pain


Immunocompromised: denies: Immunocompromised





PD PAST MEDICAL HISTORY





- Past Medical History


Past Medical History: Yes


Respiratory: None


Neuro: None


Endocrine/Autoimmune: None


GI: GERD


: Benign prostate hypertrophy


HEENT: Glaucoma


Derm: None





- Past Surgical History


Past Surgical History: Yes





- Present Medications


Home Medications: 


 Ambulatory Orders











 Medication  Instructions  Recorded  Confirmed


 


HYDROcod/ACETAM 5/325 [Vicodin 0.5 - 1 ea PO Q6H PRN #15 tablet 11/16/14 





5/325]   


 


diazePAM [Valium] 5 mg PO TID PRN #14 tablet 10/26/16 














- Allergies


Allergies/Adverse Reactions: 


 Allergies











Allergy/AdvReac Type Severity Reaction Status Date / Time


 


diphenhydramine AdvReac Unknown Unknown Verified 10/26/16 01:46


 


naproxen AdvReac  Unknown Verified 06/09/18 03:08














- Social History


Does the pt smoke?: No


Smoking Status: Never smoker


Does the pt drink ETOH?: No


Does the pt have substance abuse?: No





- Immunizations


Immunizations are current?: Yes





- POLST


Patient has POLST: No





PD ED PE NORMAL





- Vitals


Vital signs reviewed: Yes





- General


General: Alert and oriented X 3





- HEENT


HEENT: Atraumatic





- Neck


Neck: Supple, no meningeal sign





- Cardiac


Cardiac: RRR





- Respiratory


Respiratory: No respiratory distress





- Abdomen


Abdomen: Soft, Non distended





- Derm


Derm: Normal color, Warm and dry





- Extremities


Extremities: No deformity





- Neuro


Neuro: Alert and oriented X 3


Eye Opening: Spontaneous





PD ED PE EXPANDED





- Abdomen


Abdomen: Tender to palpation, Periumbilical.  No: Rebound, Guarding





Results





- Vitals


Vitals: 


 Vital Signs - 24 hr











  06/09/18 06/09/18 06/09/18





  03:06 03:34 04:16


 


Temperature 36.0 C L  


 


Heart Rate 71  88


 


Respiratory 17 17 16





Rate   


 


Blood Pressure 154/72 H  132/65 H


 


O2 Saturation 99  97














  06/09/18





  04:57


 


Temperature 


 


Heart Rate 51 L


 


Respiratory 17





Rate 


 


Blood Pressure 123/69


 


O2 Saturation 97








 Oxygen











O2 Source                      Room air

















- Labs


Labs: 


 Laboratory Tests











  06/09/18 06/09/18 06/09/18





  03:20 03:20 03:20


 


WBC  7.3  


 


RBC  4.79  


 


Hgb  15.1  


 


Hct  45.6  


 


MCV  95.1 H  


 


MCH  31.5 H  


 


MCHC  33.1  


 


RDW  13.4  


 


Plt Count  209  


 


MPV  7.9  


 


Neut # (Auto)  4.8  


 


Lymph # (Auto)  1.4 L  


 


Mono # (Auto)  0.6  


 


Eos # (Auto)  0.2  


 


Baso # (Auto)  0.4 H  


 


Absolute Nucleated RBC  0.01  


 


Nucleated RBC %  0.1  


 


VBG pH   


 


VBG pCO2   


 


VBG pO2   


 


VBG HCO3   


 


VBG Total CO2   


 


VBG O2 Saturation   


 


VBG Base Excess   


 


Sodium   137 


 


Potassium   4.3 


 


Chloride   99 L 


 


Carbon Dioxide   31 


 


Anion Gap   7.0 


 


BUN   22 H 


 


Creatinine   1.2 


 


Estimated GFR (MDRD)   57 L 


 


Glucose   104 H 


 


Lactic Acid    0.7


 


Calcium   9.5 


 


Magnesium   2.2 


 


Total Bilirubin   0.9 


 


AST   18 


 


ALT   17 


 


Alkaline Phosphatase   56 


 


Total Protein   6.9 


 


Albumin   3.9 


 


Globulin   3.0 


 


Albumin/Globulin Ratio   1.3 


 


Lipase   30 














  06/09/18





  03:20


 


WBC 


 


RBC 


 


Hgb 


 


Hct 


 


MCV 


 


MCH 


 


MCHC 


 


RDW 


 


Plt Count 


 


MPV 


 


Neut # (Auto) 


 


Lymph # (Auto) 


 


Mono # (Auto) 


 


Eos # (Auto) 


 


Baso # (Auto) 


 


Absolute Nucleated RBC 


 


Nucleated RBC % 


 


VBG pH  7.397


 


VBG pCO2  49.9


 


VBG pO2  23.7 L


 


VBG HCO3  30.0 H


 


VBG Total CO2  31.5 H


 


VBG O2 Saturation  48.6 L


 


VBG Base Excess  3.9 H


 


Sodium 


 


Potassium 


 


Chloride 


 


Carbon Dioxide 


 


Anion Gap 


 


BUN 


 


Creatinine 


 


Estimated GFR (MDRD) 


 


Glucose 


 


Lactic Acid 


 


Calcium 


 


Magnesium 


 


Total Bilirubin 


 


AST 


 


ALT 


 


Alkaline Phosphatase 


 


Total Protein 


 


Albumin 


 


Globulin 


 


Albumin/Globulin Ratio 


 


Lipase 














- Rads (name of study)


  ** ct abd pelvis


Radiology: Final report received (mild stool burden, diverticulosis, no 

diverticulitis)





PD MEDICAL DECISION MAKING





- ED course


Complexity details: reviewed old records, reviewed results, re-evaluated patient

, considered differential, d/w patient, d/w family


ED course: 





Patient was seen and examined at bedside.  IV access was gained and labs were 

drawn.  Patient stated that he did not need pain medication at that time.  

Patient was started on IV fluids.  Imaging was ordered.  When patient returned 

from imaging the results were reviewed. there was no significant abdominal 

abnormalities, only mild constipation.  patient required no further work up at 

this time and was stable for discharge with outpatient follow up.  





- Sepsis Event


Vital Signs: 


 Vital Signs - 24 hr











  06/09/18 06/09/18 06/09/18





  03:06 03:34 04:16


 


Temperature 36.0 C L  


 


Heart Rate 71  88


 


Respiratory 17 17 16





Rate   


 


Blood Pressure 154/72 H  132/65 H


 


O2 Saturation 99  97














  06/09/18





  04:57


 


Temperature 


 


Heart Rate 51 L


 


Respiratory 17





Rate 


 


Blood Pressure 123/69


 


O2 Saturation 97








 Oxygen











O2 Source                      Room air

















Departure





- Departure


Disposition: 01 Home, Self Care


Clinical Impression: 


 Abdominal pain, Constipation





Condition: Good


Instructions:  ED Constipation


Follow-Up: 


Mahesh Lopez MD [Primary Care Provider] - Within 3 Days


Comments: 


Your diagnostics today were within normal limits.  there was a moderate amount 

of stool indicating constipation.  You should increase your fluid intake and 

take metamucil or its equivalent daily.  You can also take tums or maalox for 

epigastric pain.  you should follow up with your doctor if symptoms persist.  

you may return to the emergency department at any time for new, worsening or 

uncontrollable symptoms. Please wear splint until you are cleared by ortho. Return to the Emergency Department if you have any new or worsening symptoms, or if you have any concerns. OTHERWISE, PLEASE FOLLOW UP WITH PEDS ORTHO IN 2 WEEKS.     Please reach out to Angie Ortiz ([facility] ED clinical referral coordinator) to assist you with your follow-up appointment.     Monday - Friday 11am-7pm  [phone]  [email or web address]    Torus Fracture, Pediatric       A torus fracture is a break in any long bone. This type of fracture happens when one side of a bone gets pushed in and the other side of the bone bends out. This is not a complete break in the bone. Torus fractures occur most often in the long bones of the forearm (radius and ulna).    Torus fractures are common in children because their bones are softer than adult bones. Another name for a torus fracture is a buckle fracture.      What are the causes?  This injury is caused when too much force is applied to a bone. This can happen because of:  •A fall onto an outstretched arm.      •A hard, direct hit.      •A car accident.        What increases the risk?    This injury is more likely to happen to children who are younger than 7 years old.      What are the signs or symptoms?  Symptoms of this injury include:  •Pain.      •Swelling.      •Refusal to use or move the fractured arm or leg.        How is this diagnosed?  This injury may be diagnosed based on:  •Your child's symptoms and history of injury.      •A physical exam.      •X-rays.        How is this treated?    This injury is treated with a cast or splint that is worn for 3–4 weeks to support the bone. This protects the injured area and keeps the bone in place while it heals.    In more severe injuries, the bones may need to be gently pushed into place (closed reduction) and then a cast or splint will be worn.      Follow these instructions at home:    If your child has a cast or splint:     • Do not allow your child to put pressure on any part of the cast or splint until it is fully hardened. This may take several hours.      •Keep it clean and dry.      •Check the skin around it every day. Tell your child's health care provider about any concerns.      If your child has a cast:     • Do not allow your child to stick anything inside the cast to scratch his or her skin. Doing that increases the risk of infection.      •You may put lotion on dry skin around the edges of the cast. Do not put lotion on the skin underneath the cast.      If your child has a splint:     •Have your child wear the splint as told by his or her health care provider. Remove it only as told by the health care provider.      •Loosen the splint if your child's fingers or toes tingle, become numb, or turn cold and blue.      Bathing     • Do not have your child take baths, swim, or use a hot tub until his or her health care provider approves. Ask your child's health care provider if your child may take showers. Your child may only be allowed to have sponge baths.    •If the cast or splint is not waterproof:  •Do not let it get wet.      •Cover it with a watertight covering when your child takes a bath or shower.          Managing pain, stiffness, and swelling    •If directed, put ice on the injured area.  •If your child has a removable splint, remove it as told by your child's health care provider.      •Put ice in a plastic bag.      •Place a towel between your child's skin and the bag or between the cast and the bag.      •Leave the ice on for 20 minutes, 2–3 times a day.        •Have your child gently move his or her fingers or toes often to reduce stiffness and swelling.      •Have your child raise (elevate) the injured area above the level of his or her heart while he or she is sitting or lying down.      Activity     • Do not allow your child to use the injured limb to support his or her body weight until your child's health care provider says that it is okay. Your child should use crutches as told by his or her health care provider.      •Your child may have to avoid certain activities until the cast or splint is removed.      •Have your child return to normal activities as told by his or her health care provider. Ask your child's health care provider what activities are safe for your child.      General instructions     •Give over-the-counter and prescription medicines only as told by your child's health care provider.      •Keep all follow-up visits as told by your child's health care provider. This is important.        Contact a health care provider if:    •Your child has pain.      •Your child's cast or splint becomes loose or damaged.        Get help right away if:    •Your child has increasing pain.      •Your child has swelling that does not go away with elevation.      •Your child loses feeling in the fingers or toes.      •Your child's fingers or toes turn cold and pale or blue.        Summary    •A torus fracture is a break in any long bone. This type of fracture happens when one side of a bone gets pushed in and the other side of the bone bends out.      •This injury is treated with a cast or splint that is worn for 3–4 weeks to support the bone. This protects the injured area and keeps the bone in place while it heals.      •Have your child raise (elevate) the injured area above the level of his or her heart while he or she is sitting or lying down.      • Do not allow your child to use the injured limb to support his or her body weight until your child's health care provider says that it is okay.      •Keep all follow-up visits as told by your child's health care provider. This is important.      This information is not intended to replace advice given to you by your health care provider. Make sure you discuss any questions you have with your health care provider.      Document Revised: 12/19/2019 Document Reviewed: 12/19/2019    [employer] Patient Education © 2021 [employer]